# Patient Record
Sex: FEMALE | URBAN - METROPOLITAN AREA
[De-identification: names, ages, dates, MRNs, and addresses within clinical notes are randomized per-mention and may not be internally consistent; named-entity substitution may affect disease eponyms.]

---

## 2017-04-17 ENCOUNTER — APPOINTMENT (RX ONLY)
Dept: URBAN - METROPOLITAN AREA CLINIC 77 | Facility: CLINIC | Age: 52
Setting detail: DERMATOLOGY
End: 2017-04-17

## 2017-04-17 DIAGNOSIS — L64.8 OTHER ANDROGENIC ALOPECIA: ICD-10-CM

## 2017-04-17 DIAGNOSIS — L65.8 OTHER SPECIFIED NONSCARRING HAIR LOSS: ICD-10-CM

## 2017-04-17 DIAGNOSIS — L57.0 ACTINIC KERATOSIS: ICD-10-CM

## 2017-04-17 DIAGNOSIS — L98.8 OTHER SPECIFIED DISORDERS OF THE SKIN AND SUBCUTANEOUS TISSUE: ICD-10-CM

## 2017-04-17 DIAGNOSIS — L82.0 INFLAMED SEBORRHEIC KERATOSIS: ICD-10-CM

## 2017-04-17 PROBLEM — L65.9 NONSCARRING HAIR LOSS, UNSPECIFIED: Status: ACTIVE | Noted: 2017-04-17

## 2017-04-17 PROCEDURE — ? PHOTODYNAMIC THERAPY COUNSELING

## 2017-04-17 PROCEDURE — 99203 OFFICE O/P NEW LOW 30 MIN: CPT | Mod: 25

## 2017-04-17 PROCEDURE — ? TREATMENT REGIMEN

## 2017-04-17 PROCEDURE — ? BIOPSY BY PUNCH METHOD

## 2017-04-17 PROCEDURE — 17110 DESTRUCTION B9 LES UP TO 14: CPT

## 2017-04-17 PROCEDURE — ? PRESCRIPTION

## 2017-04-17 PROCEDURE — 11100: CPT | Mod: 59

## 2017-04-17 PROCEDURE — ? LIQUID NITROGEN

## 2017-04-17 PROCEDURE — ? COUNSELING

## 2017-04-17 RX ORDER — UREA 40 G/100G
CREAM TOPICAL
Qty: 1 | Refills: 6 | Status: ERX | COMMUNITY
Start: 2017-04-17

## 2017-04-17 RX ADMIN — UREA: 40 CREAM TOPICAL at 15:21

## 2017-04-17 ASSESSMENT — LOCATION SIMPLE DESCRIPTION DERM
LOCATION SIMPLE: POSTERIOR SCALP
LOCATION SIMPLE: LEFT FOREARM
LOCATION SIMPLE: SCALP
LOCATION SIMPLE: LEFT ELBOW
LOCATION SIMPLE: ANTERIOR SCALP
LOCATION SIMPLE: LEFT CHEEK
LOCATION SIMPLE: RIGHT ELBOW
LOCATION SIMPLE: RIGHT CHEEK
LOCATION SIMPLE: RIGHT FOREARM

## 2017-04-17 ASSESSMENT — LOCATION DETAILED DESCRIPTION DERM
LOCATION DETAILED: LEFT OCCIPITAL SCALP
LOCATION DETAILED: LEFT ELBOW
LOCATION DETAILED: RIGHT CENTRAL MALAR CHEEK
LOCATION DETAILED: LEFT INFERIOR CENTRAL MALAR CHEEK
LOCATION DETAILED: MID-FRONTAL SCALP
LOCATION DETAILED: RIGHT SUPERIOR PARIETAL SCALP
LOCATION DETAILED: RIGHT ELBOW
LOCATION DETAILED: RIGHT INFERIOR CENTRAL MALAR CHEEK
LOCATION DETAILED: RIGHT PROXIMAL DORSAL FOREARM
LOCATION DETAILED: LEFT PROXIMAL DORSAL FOREARM

## 2017-04-17 ASSESSMENT — LOCATION ZONE DERM
LOCATION ZONE: ARM
LOCATION ZONE: FACE
LOCATION ZONE: SCALP

## 2017-04-17 NOTE — PROCEDURE: BIOPSY BY PUNCH METHOD
X Depth Of Punch In Cm (Optional): 0
Consent: Written consent was obtained and risks were reviewed including but not limited to scarring, infection, bleeding, scabbing, incomplete removal, nerve damage and allergy to anesthesia.
Deep Sutures: 5-0 Vicryl
Hemostasis: None
Lab Facility: [id number]
Epidermal Sutures: 5-0 Ethilon
Bill 04205 For Specimen Handling/Conveyance To Laboratory?: no
Suture Removal: 7 days
Detail Level: Detailed
Anesthesia Type: 1% lidocaine with epinephrine
Notification Instructions: Patient will be notified of biopsy results. However, patient instructed to call the office if not contacted within 2 weeks.
Home Suture Removal Text: Patient was provided a home suture removal kit and will remove their sutures at home.  If they have any questions or difficulties they will call the office.
Wound Care: Bacitracin
Dressing: bandage
Anesthesia Volume In Cc (Will Not Render If 0): 0.5
Post-Care Instructions: I reviewed with the patient in detail post-care instructions. Patient is to keep the biopsy site dry overnight, and then apply bacitracin twice daily until healed. Patient may apply hydrogen peroxide soaks to remove any crusting.
Lab: [id number]
Biopsy Type: H and E
Body Location Override (Optional - Billing Will Still Be Based On Selected Body Map Location If Applicable): Scalp
Punch Size In Mm: 6
Billing Type: Third-Party Bill

## 2017-04-17 NOTE — PROCEDURE: LIQUID NITROGEN
Detail Level: Zone
Include Z78.9 (Other Specified Conditions Influencing Health Status) As An Associated Diagnosis?: No
Number Of Freeze-Thaw Cycles: 3 freeze-thaw cycles
Medical Necessity Clause: This procedure was medically necessary because the lesions that were treated were:
Post-Care Instructions: I reviewed with the patient in detail post-care instructions. Patient is to wear sunprotection, and avoid picking at any of the treated lesions. Pt may apply Vaseline to crusted or scabbing areas.
Medical Necessity Information: It is in your best interest to select a reason for this procedure from the list below. All of these items fulfill various CMS LCD requirements except the new and changing color options.
Consent: The patient's consent was obtained including but not limited to risks of crusting, scabbing, blistering, scarring, darker or lighter pigmentary change, recurrence, incomplete removal and infection.

## 2017-04-17 NOTE — PROCEDURE: TREATMENT REGIMEN
Plan: Location: scalp\\nPt states that she noticed her hair has become very brittle and hair loss is more diffused \\nShe states she tried numerous OTC and homogenic hair products, essential oils, etc... also has been possibly overdosing on Biotin because she has been over stressing on her hair.\\nDiscussed with her that her scalp does appear to be a scarring alopecia like lupus or lichen planopilaris with no appearance of scaling or redness\\nPt states that she has been very concerned \\nDiscussed with her that most likely her hair is changing because she is reaching menopause and her hormones are constantly changing
Detail Level: Zone
Plan: Location: elbows\\nPrescribe: urea 40% cream\\nPt states that skin is occasionally dry and feels rough. We will start her on urea cream to help with scaling

## 2017-04-24 ENCOUNTER — APPOINTMENT (RX ONLY)
Dept: URBAN - METROPOLITAN AREA CLINIC 77 | Facility: CLINIC | Age: 52
Setting detail: DERMATOLOGY
End: 2017-04-24

## 2017-04-24 DIAGNOSIS — L65.0 TELOGEN EFFLUVIUM: ICD-10-CM

## 2017-04-24 DIAGNOSIS — L98.8 OTHER SPECIFIED DISORDERS OF THE SKIN AND SUBCUTANEOUS TISSUE: ICD-10-CM

## 2017-04-24 PROCEDURE — ? PRESCRIPTION

## 2017-04-24 PROCEDURE — ? COUNSELING

## 2017-04-24 PROCEDURE — ? TREATMENT REGIMEN

## 2017-04-24 PROCEDURE — 99213 OFFICE O/P EST LOW 20 MIN: CPT | Mod: 24

## 2017-04-24 PROCEDURE — ? SUTURE REMOVAL (GLOBAL PERIOD)

## 2017-04-24 RX ORDER — SPIRONOLACTONE 100 MG/1
TABLET, FILM COATED ORAL
Qty: 30 | Refills: 3 | Status: ERX | COMMUNITY
Start: 2017-04-24

## 2017-04-24 RX ORDER — UREA 40 G/100G
CREAM TOPICAL
Qty: 1 | Refills: 6 | Status: ERX

## 2017-04-24 RX ADMIN — SPIRONOLACTONE: 100 TABLET, FILM COATED ORAL at 13:49

## 2017-04-24 ASSESSMENT — LOCATION ZONE DERM
LOCATION ZONE: ARM
LOCATION ZONE: SCALP

## 2017-04-24 ASSESSMENT — LOCATION DETAILED DESCRIPTION DERM
LOCATION DETAILED: LEFT ELBOW
LOCATION DETAILED: LEFT SUPERIOR PARIETAL SCALP
LOCATION DETAILED: RIGHT ELBOW

## 2017-04-24 ASSESSMENT — LOCATION SIMPLE DESCRIPTION DERM
LOCATION SIMPLE: SCALP
LOCATION SIMPLE: LEFT ELBOW
LOCATION SIMPLE: RIGHT ELBOW

## 2017-04-24 NOTE — PROCEDURE: SUTURE REMOVAL (GLOBAL PERIOD)
Add 58271 Cpt? (Important Note: In 2017 The Use Of 88764 Is Being Tracked By Cms To Determine Future Global Period Reimbursement For Global Periods): no
Detail Level: Detailed

## 2017-04-24 NOTE — PROCEDURE: TREATMENT REGIMEN
Detail Level: Zone
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, with a background component of androgenic alopecia to be considered.\\nPt seems very self conscious and anxious today, discussed with pt that reducing her stress levels can help reduce condition.\\nDiscussed with her that hair loss typically occurs when immune system becomes reactive and starts attacking hair follicles.\\nWill prescribe pt Spironolactone 100mg po qd x 30 days to help block the receptors from attacking her skin.\\nDiscussed with pt to consult with her  before starting medication, so it will not interfere with her court case.\\nWill f/u in 6 weeks.\\n\\n-------------\\n04/17/2017\\nLocation: scalp\\n\\nPt states that she noticed her hair has become very brittle and hair loss is more diffused \\nShe states she tried numerous OTC and homogenic hair products, essential oils, etc... also has been possibly overdosing on Biotin because she has been over stressing on her hair.\\nDiscussed with her that her scalp does appear to be a scarring alopecia like lupus or lichen planopilaris with no appearance of scaling or redness\\nPt states that she has been very concerned \\nDiscussed with her that most likely her hair is changing because she is reaching menopause and her hormones are constantly changing
Plan: Location: elbows\\nPrescribe: urea 40% cream\\nPt states that skin is occasionally dry and feels rough. We will start her on urea cream to help with scaling

## 2017-05-08 ENCOUNTER — APPOINTMENT (RX ONLY)
Dept: URBAN - METROPOLITAN AREA CLINIC 77 | Facility: CLINIC | Age: 52
Setting detail: DERMATOLOGY
End: 2017-05-08

## 2017-05-08 DIAGNOSIS — L57.0 ACTINIC KERATOSIS: ICD-10-CM

## 2017-05-08 PROCEDURE — ? PDT: BLUE

## 2017-05-08 PROCEDURE — 96567 PDT DSTR PRMLG LES SKN: CPT

## 2017-05-08 PROCEDURE — ? PRESCRIPTION

## 2017-05-08 RX ORDER — VALACYCLOVIR HYDROCHLORIDE 1 G/1
TABLET, FILM COATED ORAL
Qty: 56 | Refills: 0 | Status: ERX | COMMUNITY
Start: 2017-05-08

## 2017-05-08 RX ORDER — PREDNISONE 20 MG/1
TABLET ORAL
Qty: 5 | Refills: 0 | Status: ERX | COMMUNITY
Start: 2017-05-08

## 2017-05-08 RX ADMIN — PREDNISONE: 20 TABLET ORAL at 13:44

## 2017-05-08 RX ADMIN — VALACYCLOVIR HYDROCHLORIDE: 1 TABLET, FILM COATED ORAL at 13:44

## 2017-05-08 ASSESSMENT — LOCATION SIMPLE DESCRIPTION DERM
LOCATION SIMPLE: RIGHT CHEEK
LOCATION SIMPLE: LEFT CHEEK
LOCATION SIMPLE: NOSE
LOCATION SIMPLE: LEFT EYEBROW
LOCATION SIMPLE: RIGHT LIP
LOCATION SIMPLE: RIGHT FOREHEAD
LOCATION SIMPLE: LEFT FOREHEAD
LOCATION SIMPLE: RIGHT NOSE

## 2017-05-08 ASSESSMENT — LOCATION ZONE DERM
LOCATION ZONE: FACE
LOCATION ZONE: LIP
LOCATION ZONE: NOSE

## 2017-05-08 ASSESSMENT — LOCATION DETAILED DESCRIPTION DERM
LOCATION DETAILED: LEFT LATERAL EYEBROW
LOCATION DETAILED: RIGHT NASAL SIDEWALL
LOCATION DETAILED: RIGHT LOWER CUTANEOUS LIP
LOCATION DETAILED: LEFT INFERIOR MEDIAL FOREHEAD
LOCATION DETAILED: RIGHT INFERIOR FOREHEAD
LOCATION DETAILED: LEFT SUPERIOR MEDIAL BUCCAL CHEEK
LOCATION DETAILED: LEFT INFERIOR FOREHEAD
LOCATION DETAILED: NASAL SUPRATIP
LOCATION DETAILED: LEFT INFERIOR LATERAL FOREHEAD
LOCATION DETAILED: RIGHT CENTRAL MALAR CHEEK

## 2017-05-08 NOTE — PROCEDURE: PDT: BLUE
Anesthesia Volume In Cc: 0
Detail Level: Zone
Light Source: Hermes-U
Post-Care Instructions: I reviewed with the patient in detail post-care instructions. Patient is to avoid sunlight for the next 2 days, and wear sun protection. Patients may expect sunburn like redness, discomfort and scabbing.
Consent: Written consent obtained.  The risks were reviewed with the patient including but not limited to: pigmentary changes, pain, blistering, scabbing, redness, and the remote possibility of scarring.
Was Levulan/Ameluz Applied On A Previous Day?: No
Which Photosensitizer Was Used: Levulan
Pre-Procedure Text: The treatment areas were cleaned and prepped in the usual fashion with ethyl alcohol.
Anesthesia Type: 1% lidocaine with epinephrine
Illumination Time: 00:16:40
Incubation Time: 02:00:00
Number Of Kerasticks/Tubes Billed For: 1

## 2017-06-06 ENCOUNTER — APPOINTMENT (RX ONLY)
Dept: URBAN - METROPOLITAN AREA CLINIC 77 | Facility: CLINIC | Age: 52
Setting detail: DERMATOLOGY
End: 2017-06-06

## 2017-06-06 DIAGNOSIS — B02.9 ZOSTER WITHOUT COMPLICATIONS: ICD-10-CM

## 2017-06-06 PROCEDURE — ? PRESCRIPTION

## 2017-06-06 PROCEDURE — ? TREATMENT REGIMEN

## 2017-06-06 PROCEDURE — ? COUNSELING

## 2017-06-06 PROCEDURE — 99213 OFFICE O/P EST LOW 20 MIN: CPT

## 2017-06-06 RX ORDER — ACYCLOVIR 50 MG/G
CREAM TOPICAL
Qty: 1 | Refills: 1 | Status: ERX | COMMUNITY
Start: 2017-06-06

## 2017-06-06 RX ADMIN — ACYCLOVIR: 50 CREAM TOPICAL at 20:02

## 2017-06-06 ASSESSMENT — LOCATION DETAILED DESCRIPTION DERM
LOCATION DETAILED: LEFT MEDIAL BUTTOCK
LOCATION DETAILED: GLUTEAL CLEFT
LOCATION DETAILED: LEFT INFERIOR MEDIAL LOWER BACK
LOCATION DETAILED: RIGHT INFERIOR MEDIAL LOWER BACK

## 2017-06-06 ASSESSMENT — LOCATION SIMPLE DESCRIPTION DERM
LOCATION SIMPLE: LEFT LOWER BACK
LOCATION SIMPLE: LEFT BUTTOCK
LOCATION SIMPLE: RIGHT LOWER BACK
LOCATION SIMPLE: GLUTEAL CLEFT

## 2017-06-06 ASSESSMENT — LOCATION ZONE DERM: LOCATION ZONE: TRUNK

## 2017-06-06 NOTE — PROCEDURE: TREATMENT REGIMEN
Plan: Location: buttock region\\nPrescribe: Acyclovir 5% cream \\nPt states that she has a history of herpes zoster. She has developed erythematous plaques with pustules and blistering that are very painful. Pt states she does not like taking oral medication and prefers herbal remedies.\\nDiscussed with her that this is the prototypical appearance of herpes zoster and is typically treated with Valtrex.\\nEducated her that this is an immune mediated condition and can trigger with stress. Pt states she prefers not to take medication and \"wants her body to heal on its own.\" \\nDiscussed with her that this is a secondary herpes infection and if it does not clear up on its own, will recommend taking Valtrex or acyclovir. Will have her try a topical to use whenever she has a tingling sensation\\nIf she continues to have regular HSV outbreaks, then valtrex will be the only option to help her body build immunity to the virus.
Detail Level: Zone

## 2017-07-26 ENCOUNTER — APPOINTMENT (RX ONLY)
Dept: URBAN - METROPOLITAN AREA CLINIC 77 | Facility: CLINIC | Age: 52
Setting detail: DERMATOLOGY
End: 2017-07-26

## 2017-07-26 ENCOUNTER — RX ONLY (OUTPATIENT)
Age: 52
Setting detail: RX ONLY
End: 2017-07-26

## 2017-07-26 RX ORDER — FAMCICLOVIR 250 MG/1
TABLET ORAL
Qty: 60 | Refills: 0 | Status: ERX | COMMUNITY
Start: 2017-07-26

## 2023-12-29 NOTE — HPI: HAIR LOSS
Detail Level: Zone
How Did The Hair Loss Occur?: gradual in onset
How Severe Is Your Hair Loss?: mild